# Patient Record
Sex: FEMALE | Race: WHITE | NOT HISPANIC OR LATINO | URBAN - METROPOLITAN AREA
[De-identification: names, ages, dates, MRNs, and addresses within clinical notes are randomized per-mention and may not be internally consistent; named-entity substitution may affect disease eponyms.]

---

## 2019-11-12 ENCOUNTER — TRANSFERRED RECORDS (OUTPATIENT)
Dept: PHYSICAL THERAPY | Facility: CLINIC | Age: 54
End: 2019-11-12

## 2024-04-29 ENCOUNTER — MEDICAL CORRESPONDENCE (OUTPATIENT)
Dept: HEALTH INFORMATION MANAGEMENT | Facility: CLINIC | Age: 59
End: 2024-04-29
Payer: COMMERCIAL

## 2024-05-02 ENCOUNTER — TRANSCRIBE ORDERS (OUTPATIENT)
Dept: OTHER | Age: 59
End: 2024-05-02

## 2024-05-02 DIAGNOSIS — H71.91 CHOLESTEATOMA OF RIGHT EAR: Primary | ICD-10-CM

## 2024-05-02 NOTE — TELEPHONE ENCOUNTER
FUTURE VISIT INFORMATION      FUTURE VISIT INFORMATION:  Date: 7/19/24  Time: 8:40 AM  Location: [facility] [place] - ENT  REFERRAL INFORMATION:  Referring provider:  Mora Ramos NP  Referring providers clinic:  [facility]  Reason for visit/diagnosis:  Cholesteatoma of right ear [H71.91] referred by Mora Ramos NP in GENERIC EXTERNAL DATA DEPARTMENT, per pt     RECORDS REQUESTED FROM      Clinic name Comments Records Status Imaging Status   [facility] 4/29/24 NOTE- Mora Ramos NP CE    [facility] 5/29/19 CT head  4/4/18 MR brain  10/18/10 MR brain CE PACS   Procedure - [facility] 7/20/2009 RIGHT MEATOPLASTY AND RIGHT REVISION MASTOIDECTOMY WITH FACIAL NERVE MONITORING with Bharath Higuera MD   [facility]ly [facility]- [facility] 3/2/10, 2/10/10 note- aMrk Wooten MD [facility] 2/10/10 CT temporal [facility] PACS           Records Requested     July 2, 2024 1:05 PM  [place] [id number]   Facility  [facility]  Fax: [phone]    Outcome Request faxed to [facility] for images to be pushed to PACS. Doyle Palma     July 3, 2024 10:19 AM - Images received from [facility] and resovled in PACS. Doyle Palma

## 2024-07-18 ENCOUNTER — APPOINTMENT (OUTPATIENT)
Dept: INTERPRETER SERVICES | Facility: CLINIC | Age: 59
End: 2024-07-18
Payer: COMMERCIAL

## 2024-07-18 ENCOUNTER — TELEPHONE (OUTPATIENT)
Dept: OTOLARYNGOLOGY | Facility: CLINIC | Age: 59
End: 2024-07-18
Payer: COMMERCIAL

## 2024-07-19 ENCOUNTER — PRE VISIT (OUTPATIENT)
Dept: OTOLARYNGOLOGY | Facility: CLINIC | Age: 59
End: 2024-07-19